# Patient Record
Sex: FEMALE | Race: WHITE | NOT HISPANIC OR LATINO | Employment: FULL TIME | RURAL
[De-identification: names, ages, dates, MRNs, and addresses within clinical notes are randomized per-mention and may not be internally consistent; named-entity substitution may affect disease eponyms.]

---

## 2020-12-28 ENCOUNTER — HISTORICAL (OUTPATIENT)
Dept: ADMINISTRATIVE | Facility: HOSPITAL | Age: 45
End: 2020-12-28

## 2020-12-28 LAB
FLUAV AG UPPER RESP QL IA.RAPID: NEGATIVE
FLUBV AG UPPER RESP QL IA.RAPID: NEGATIVE
RAPID GROUP A STREP: NEGATIVE
SARS-COV+SARS-COV-2 AG RESP QL IA.RAPID: POSITIVE

## 2020-12-31 LAB
REPORT: NORMAL

## 2023-08-11 ENCOUNTER — HOSPITAL ENCOUNTER (EMERGENCY)
Facility: HOSPITAL | Age: 48
Discharge: HOME | End: 2023-08-11
Payer: COMMERCIAL

## 2023-08-11 VITALS
HEIGHT: 61 IN | HEART RATE: 66 BPM | WEIGHT: 280 LBS | RESPIRATION RATE: 18 BRPM | BODY MASS INDEX: 52.87 KG/M2 | TEMPERATURE: 98 F | DIASTOLIC BLOOD PRESSURE: 90 MMHG | OXYGEN SATURATION: 100 % | SYSTOLIC BLOOD PRESSURE: 191 MMHG

## 2023-08-11 DIAGNOSIS — M17.11 OSTEOARTHRITIS OF RIGHT KNEE, UNSPECIFIED OSTEOARTHRITIS TYPE: Primary | ICD-10-CM

## 2023-08-11 DIAGNOSIS — M25.561 RIGHT KNEE PAIN: ICD-10-CM

## 2023-08-11 PROCEDURE — 99284 EMERGENCY DEPT VISIT MOD MDM: CPT | Mod: ,,, | Performed by: PHYSICIAN ASSISTANT

## 2023-08-11 PROCEDURE — 96372 THER/PROPH/DIAG INJ SC/IM: CPT | Performed by: PHYSICIAN ASSISTANT

## 2023-08-11 PROCEDURE — 63600175 PHARM REV CODE 636 W HCPCS: Performed by: PHYSICIAN ASSISTANT

## 2023-08-11 PROCEDURE — 99284 EMERGENCY DEPT VISIT MOD MDM: CPT

## 2023-08-11 PROCEDURE — 99284 PR EMERGENCY DEPT VISIT,LEVEL IV: ICD-10-PCS | Mod: ,,, | Performed by: PHYSICIAN ASSISTANT

## 2023-08-11 RX ORDER — TAMSULOSIN HYDROCHLORIDE 0.4 MG/1
1 CAPSULE ORAL
COMMUNITY

## 2023-08-11 RX ORDER — MELOXICAM 15 MG/1
15 TABLET ORAL DAILY
Qty: 30 TABLET | Refills: 0 | Status: SHIPPED | OUTPATIENT
Start: 2023-08-11

## 2023-08-11 RX ORDER — KETOROLAC TROMETHAMINE 30 MG/ML
30 INJECTION, SOLUTION INTRAMUSCULAR; INTRAVENOUS
Status: COMPLETED | OUTPATIENT
Start: 2023-08-11 | End: 2023-08-11

## 2023-08-11 RX ORDER — LISINOPRIL 20 MG/1
TABLET ORAL
COMMUNITY

## 2023-08-11 RX ORDER — GABAPENTIN 300 MG/1
300 CAPSULE ORAL
COMMUNITY
Start: 2023-07-22

## 2023-08-11 RX ORDER — IBUPROFEN 800 MG/1
800 TABLET ORAL EVERY 8 HOURS PRN
COMMUNITY
Start: 2023-07-21

## 2023-08-11 RX ORDER — DULOXETIN HYDROCHLORIDE 30 MG/1
1 CAPSULE, DELAYED RELEASE ORAL
COMMUNITY

## 2023-08-11 RX ORDER — AMLODIPINE BESYLATE 10 MG/1
TABLET ORAL
COMMUNITY

## 2023-08-11 RX ORDER — CYCLOBENZAPRINE HCL 10 MG
10 TABLET ORAL 2 TIMES DAILY PRN
COMMUNITY
Start: 2023-06-25

## 2023-08-11 RX ADMIN — KETOROLAC TROMETHAMINE 30 MG: 30 INJECTION, SOLUTION INTRAMUSCULAR; INTRAVENOUS at 12:08

## 2023-08-11 NOTE — ED PROVIDER NOTES
Encounter Date: 8/11/2023       History     Chief Complaint   Patient presents with    Knee Pain     Right knee pain      Patient is a 47-year-old female with history of right knee pain for the past 3 days.    She says she felt a pop, and ever since then it has been popping when she moves it.    She has a past medical history consistent for hypertension, she is not taken her antihypertensive medications today.    She denies any past surgical history.    Denies any tobacco alcohol or drug use.      Review of patient's allergies indicates:  No Known Allergies  History reviewed. No pertinent past medical history.  History reviewed. No pertinent surgical history.  History reviewed. No pertinent family history.     Review of Systems   Musculoskeletal:         Right knee pain   All other systems reviewed and are negative.      Physical Exam     Initial Vitals   BP Pulse Resp Temp SpO2   08/11/23 1223 08/11/23 1221 08/11/23 1221 08/11/23 1221 08/11/23 1221   (!) 191/90 66 18 97.8 °F (36.6 °C) 100 %      MAP       --                Physical Exam    Nursing note and vitals reviewed.  Constitutional:   Obese   HENT:   Head: Atraumatic.   Eyes: EOM are normal.   Cardiovascular:  Normal rate and regular rhythm.           Pulmonary/Chest: No respiratory distress.   Musculoskeletal:      Comments: Right knee:  Negative evidence of ecchymosis, or effusion.    Tenderness to palpation to the anterior aspect of the lateral joint line.    Neurovascularly intact, patient is able to bear weight on her right lower extremity     Neurological: She is alert and oriented to person, place, and time.   Skin: Skin is dry.   Psychiatric: She has a normal mood and affect.         Medical Screening Exam   See Full Note    ED Course   Procedures  Labs Reviewed - No data to display       Imaging Results              X-Ray Knee 3 View Right (Final result)  Result time 08/11/23 13:05:26      Final result by Nehemias Gerard II, MD (08/11/23  13:05:26)                   Impression:      Mild knee osteoarthrosis.      Electronically signed by: Nehemias Gerard  Date:    08/11/2023  Time:    13:05               Narrative:    EXAMINATION:  XR KNEE 3 VIEW RIGHT    CLINICAL HISTORY:  Pain in right knee    COMPARISON:  None available    TECHNIQUE:  XR KNEE 3 VIEW RIGHT    FINDINGS:  No evidence of fracture seen.  The alignment of the joints appears normal.  Mild tricompartmental degenerative change is present.  No soft tissue abnormality is seen.                                       Medications   ketorolac injection 30 mg (30 mg Intramuscular Given 8/11/23 1242)     Medical Decision Making:   Initial Assessment:   Patient is a 47-year-old female with history of right knee pain for the past 3 days.    She says she felt a pop, and ever since then it has been popping when she moves it.    She has a past medical history consistent for hypertension, she is not taken her antihypertensive medications today.    She denies any past surgical history.    Denies any tobacco alcohol or drug use.  Differential Diagnosis:   Right knee strain, versus is meniscal pathology, osteoarthritis  ED Management:  Patient will get Toradol 30 mg IM.    I will get x-ray of the right knee.    I will give her a prescription for Mobic, and Robaxin.  She will follow up with her primary care provider to get a referral for MRI, and orthopedic consultation.    X-rays negative for fracture.                             Clinical Impression:   Final diagnoses:  [M25.561] Right knee pain               Preet Cope PA  08/11/23 1241       Preet Cope PA  08/11/23 1323

## 2025-07-29 ENCOUNTER — TELEPHONE (OUTPATIENT)
Dept: ORTHOPEDICS | Facility: CLINIC | Age: 50
End: 2025-07-29
Payer: COMMERCIAL

## 2025-07-29 NOTE — TELEPHONE ENCOUNTER
Left message for Airam to call our office back. I have some questions I need to ask before scheduling this patient.

## 2025-07-29 NOTE — TELEPHONE ENCOUNTER
Received records from [facility]. I will get Dr. Garcia to review these when he comes in to the office in the morning.

## 2025-07-29 NOTE — TELEPHONE ENCOUNTER
Copied from CRM #[id number]. Topic: General Inquiry - Patient Advice  >> Jul 29, 2025  2:55 PM Cher wrote:  Who Called: Nicole Bennett      Who Called: DOV @ [facility]   [phone] PHONE   [phone] FAX     States pt is requesting to see Dr Garcia for a second opinion on her right knee. States she had a procedure w Dr Castro earlier this YEAR*      Preferred Method of Contact: Phone Call  Patient's Preferred Phone Number on File: [phone]   Best Call Back Number, if different:[phone]   Additional Information: MRI DONE in June, denied scheduling w Thalia, requests for Dr Garcia.  Dov states will send over a copy of pts MRI      Sorry, meant earlier this YEAR not week

## 2025-07-29 NOTE — TELEPHONE ENCOUNTER
Copied from CRM #[id number]. Topic: Appointments - Appointment Scheduling  >> Jul 29, 2025  2:37 PM Cher wrote:  Who Called: CINTHIA @ [facility]   [phone] PHONE   [phone] FAX    States pt is requesting to see Dr Garcia for a second opinion on her right knee. States she had a procedure w Dr Castro earlier this week     Preferred Method of Contact: Phone Call  Patient's Preferred Phone Number on File: [phone]   Best Call Back Number, if different:[phone]   Additional Information: MRI DONE in June, denied scheduling w Thalia, requests for Dr Garcia.  Cinthia states will send over a copy of pts MRI

## 2025-08-06 ENCOUNTER — OFFICE VISIT (OUTPATIENT)
Dept: ORTHOPEDICS | Facility: CLINIC | Age: 50
End: 2025-08-06
Payer: COMMERCIAL

## 2025-08-06 ENCOUNTER — HOSPITAL ENCOUNTER (OUTPATIENT)
Dept: RADIOLOGY | Facility: HOSPITAL | Age: 50
Discharge: HOME | End: 2025-08-06
Attending: ORTHOPAEDIC SURGERY
Payer: COMMERCIAL

## 2025-08-06 VITALS
OXYGEN SATURATION: 98 % | DIASTOLIC BLOOD PRESSURE: 89 MMHG | WEIGHT: 280 LBS | SYSTOLIC BLOOD PRESSURE: 178 MMHG | HEIGHT: 61 IN | BODY MASS INDEX: 52.87 KG/M2 | HEART RATE: 63 BPM

## 2025-08-06 DIAGNOSIS — M25.561 RIGHT KNEE PAIN, UNSPECIFIED CHRONICITY: ICD-10-CM

## 2025-08-06 DIAGNOSIS — M17.11 ARTHRITIS OF RIGHT KNEE: ICD-10-CM

## 2025-08-06 DIAGNOSIS — M25.561 RIGHT KNEE PAIN, UNSPECIFIED CHRONICITY: Primary | ICD-10-CM

## 2025-08-06 PROCEDURE — 3079F DIAST BP 80-89 MM HG: CPT | Mod: ,,, | Performed by: ORTHOPAEDIC SURGERY

## 2025-08-06 PROCEDURE — 99999PBSHW PR PBB SHADOW TECHNICAL ONLY FILED TO HB: Mod: PBBFAC,,,

## 2025-08-06 PROCEDURE — 20610 DRAIN/INJ JOINT/BURSA W/O US: CPT | Mod: PBBFAC,RT | Performed by: ORTHOPAEDIC SURGERY

## 2025-08-06 PROCEDURE — 99203 OFFICE O/P NEW LOW 30 MIN: CPT | Mod: S$PBB,25,, | Performed by: ORTHOPAEDIC SURGERY

## 2025-08-06 PROCEDURE — 99214 OFFICE O/P EST MOD 30 MIN: CPT | Mod: PBBFAC,25 | Performed by: ORTHOPAEDIC SURGERY

## 2025-08-06 PROCEDURE — 73560 X-RAY EXAM OF KNEE 1 OR 2: CPT | Mod: TC,RT

## 2025-08-06 PROCEDURE — 3008F BODY MASS INDEX DOCD: CPT | Mod: ,,, | Performed by: ORTHOPAEDIC SURGERY

## 2025-08-06 PROCEDURE — 3077F SYST BP >= 140 MM HG: CPT | Mod: ,,, | Performed by: ORTHOPAEDIC SURGERY

## 2025-08-06 PROCEDURE — 99999 PR PBB SHADOW E&M-EST. PATIENT-LVL IV: CPT | Mod: PBBFAC,,, | Performed by: ORTHOPAEDIC SURGERY

## 2025-08-06 PROCEDURE — 1159F MED LIST DOCD IN RCRD: CPT | Mod: ,,, | Performed by: ORTHOPAEDIC SURGERY

## 2025-08-06 RX ORDER — BUPIVACAINE HYDROCHLORIDE 2.5 MG/ML
1 INJECTION, SOLUTION EPIDURAL; INFILTRATION; INTRACAUDAL; PERINEURAL
Status: DISCONTINUED | OUTPATIENT
Start: 2025-08-06 | End: 2025-08-06 | Stop reason: HOSPADM

## 2025-08-06 RX ORDER — TRIAMCINOLONE ACETONIDE 40 MG/ML
40 INJECTION, SUSPENSION INTRA-ARTICULAR; INTRAMUSCULAR
Status: DISCONTINUED | OUTPATIENT
Start: 2025-08-06 | End: 2025-08-06 | Stop reason: HOSPADM

## 2025-08-06 RX ADMIN — BUPIVACAINE HYDROCHLORIDE 1 ML: 2.5 INJECTION, SOLUTION EPIDURAL; INFILTRATION; INTRACAUDAL; PERINEURAL at 10:08

## 2025-08-06 RX ADMIN — TRIAMCINOLONE ACETONIDE 40 MG: 40 INJECTION, SUSPENSION INTRA-ARTICULAR; INTRAMUSCULAR at 10:08
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
## 2025-08-06 NOTE — PROGRESS NOTES
Radiology Interpretation        Patient Name: Nicole Bennett  Date: 8/6/2025  YOB: 1975  MRN# [id number]        ORDERING DIAGNOSIS:    Encounter Diagnosis   Name Primary?    Right knee pain, unspecified chronicity Yes        Two views right knee skeletally mature individual there is normal mineralization there are degenerative changes more involved medial compartment with bone-on-bone medial compartment no fractures impression degenerative changes right knee more involved medial compartment               Luis Carlos Garcia MD

## 2025-08-06 NOTE — PROCEDURES
Large Joint Aspiration/Injection: R knee    Date/Time: 8/6/2025 10:50 AM    Performed by: Luis Carlos Garcia MD  Authorized by: Luis Carlos Garcia MD      Details:  Needle Size:  22 G  Ultrasonic Guidance for needle placement?: No    Approach:  Anterolateral  Location:  Knee  Site:  R knee  Medications:  1 mL BUPivacaine (PF) 0.25% (2.5 mg/ml) 0.25 % (2.5 mg/mL); 40 mg triamcinolone acetonide 40 mg/mL  Patient tolerance:  Patient tolerated the procedure well with no immediate complications